# Patient Record
Sex: MALE | Race: WHITE | NOT HISPANIC OR LATINO | Employment: UNEMPLOYED | URBAN - NONMETROPOLITAN AREA
[De-identification: names, ages, dates, MRNs, and addresses within clinical notes are randomized per-mention and may not be internally consistent; named-entity substitution may affect disease eponyms.]

---

## 2017-11-10 ENCOUNTER — OFFICE VISIT (OUTPATIENT)
Dept: RETAIL CLINIC | Facility: CLINIC | Age: 22
End: 2017-11-10

## 2017-11-10 DIAGNOSIS — Z23 VACCINE FOR TETANUS TOXOID: Primary | ICD-10-CM

## 2017-11-10 PROCEDURE — 90714 TD VACC NO PRESV 7 YRS+ IM: CPT | Performed by: NURSE PRACTITIONER

## 2017-11-10 NOTE — PROGRESS NOTES
Mr. Camilo Booker is a 22 year old male here today for his tetanus vaccine. He had been bitten by a cat and was seen at [facility] yesterday and received antibiotics, but was unable to afford cost of vaccine at this facility and came here this morning for the vaccine. It has been at least 10 years since last vaccine.